# Patient Record
Sex: FEMALE | Race: BLACK OR AFRICAN AMERICAN | NOT HISPANIC OR LATINO | Employment: UNEMPLOYED | URBAN - METROPOLITAN AREA
[De-identification: names, ages, dates, MRNs, and addresses within clinical notes are randomized per-mention and may not be internally consistent; named-entity substitution may affect disease eponyms.]

---

## 2023-06-30 ENCOUNTER — APPOINTMENT (OUTPATIENT)
Dept: GENERAL RADIOLOGY | Facility: CLINIC | Age: 53
End: 2023-06-30
Attending: EMERGENCY MEDICINE
Payer: COMMERCIAL

## 2023-06-30 ENCOUNTER — HOSPITAL ENCOUNTER (EMERGENCY)
Facility: CLINIC | Age: 53
Discharge: HOME | End: 2023-06-30
Attending: EMERGENCY MEDICINE | Admitting: EMERGENCY MEDICINE
Payer: COMMERCIAL

## 2023-06-30 VITALS
DIASTOLIC BLOOD PRESSURE: 83 MMHG | TEMPERATURE: 97.2 F | OXYGEN SATURATION: 98 % | SYSTOLIC BLOOD PRESSURE: 119 MMHG | HEART RATE: 92 BPM | RESPIRATION RATE: 18 BRPM

## 2023-06-30 DIAGNOSIS — S91.341A PUNCTURE WOUND OF RIGHT FOOT WITH FOREIGN BODY, INITIAL ENCOUNTER: ICD-10-CM

## 2023-06-30 PROCEDURE — 73630 X-RAY EXAM OF FOOT: CPT | Mod: RT

## 2023-06-30 PROCEDURE — 250N000009 HC RX 250

## 2023-06-30 PROCEDURE — 10120 INC&RMVL FB SUBQ TISS SMPL: CPT

## 2023-06-30 PROCEDURE — 99283 EMERGENCY DEPT VISIT LOW MDM: CPT

## 2023-06-30 PROCEDURE — 99283 EMERGENCY DEPT VISIT LOW MDM: CPT | Mod: 25

## 2023-06-30 RX ORDER — LIDOCAINE HYDROCHLORIDE AND EPINEPHRINE 10; 10 MG/ML; UG/ML
INJECTION, SOLUTION INFILTRATION; PERINEURAL
Status: COMPLETED
Start: 2023-06-30 | End: 2023-06-30

## 2023-06-30 RX ADMIN — LIDOCAINE HYDROCHLORIDE,EPINEPHRINE BITARTRATE: 10; .01 INJECTION, SOLUTION INFILTRATION; PERINEURAL at 10:39

## 2023-06-30 ASSESSMENT — ACTIVITIES OF DAILY LIVING (ADL)
ADLS_ACUITY_SCORE: 35
ADLS_ACUITY_SCORE: 35

## 2023-06-30 NOTE — ED TRIAGE NOTES
Pt arrives with c/o stepping on a needle last night. Pt's family member reports they pulled it out but pt is still having pain and they are concerned a piece of it is still in the foot. Last tetanus in 2016 per [facility]. Pt ambulatory with a limp in triage.     Triage Assessment     Row Name 06/30/23 0853       Triage Assessment (Adult)    Airway WDL WDL       Respiratory WDL    Respiratory WDL WDL       Skin Circulation/Temperature WDL    Skin Circulation/Temperature WDL WDL       Cardiac WDL    Cardiac WDL WDL       Peripheral/Neurovascular WDL    Peripheral Neurovascular WDL WDL       Cognitive/Neuro/Behavioral WDL    Cognitive/Neuro/Behavioral WDL WDL

## 2023-06-30 NOTE — ED PROVIDER NOTES
[facility]  [facility]  5:00 PM  6/30/2023    Amparo Holloway  52 year oldfemale    Chief Complaint   Patient presents with     Foot Pain       HPI:  52-year-old female otherwise healthy here with right heel pain after accidentally stepping on a sewing needle last night.  They pulled out part of the needle but believe part of it is still embedded.  Tetanus is up-to-date.        ROS: ROS is negative other than mentioned above in HPI          No current outpatient medications        Not on File      Physical Exam  /83   Pulse 92   Temp 97.2  F (36.2  C) (Temporal)   Resp 18   SpO2 98%       CV: ppi, regular   Resp: speaking in full sentences without any resp distress  Extremity: Right heel just lateral of midline there is a small puncture wound site no surrounding redness no purulent drainage, no active bleeding  Neuro: Alert, no gross motor or sensory deficits,  gait stable      Labs and Imaging:    Labs Ordered and Resulted from Time of ED Arrival to Time of ED Departure - No data to display      Foot  XR, G/E 3 views, right   Final Result   IMPRESSION:      There is a 12 mm linear metallic object projecting at the plantar   aspect of the heel with pointed tip, suspicious for retained needle   fragment. No acute fracture or dislocation. Mild degenerative changes   of the first MTP joint. Small Achilles enthesophyte.      NOTE: ABNORMAL REPORT      THE DICTATION ABOVE DESCRIBES AN ABNORMAL REPORT FOR WHICH FOLLOW-UP   IS NEEDED.      DELFINA ISAACS MD            SYSTEM ID:  [id number]              Medications Administered in ED:  Medications   lidocaine 1% with EPINEPHrine 1:100,000 1 %-1:[id number] injection (  $Given by Other 6/30/23 1039)             Independent Historian:      Review of External Notes:     Independent Interpretation (X-rays, CTs, rhythm strip):       Consultations/Discussion of Management or Tests:       Social Determinants of Health affecting  care:  None          Medical Decision Making:    Right foot retained metallic foreign body after stepping on a needle.  Radiograph confirming retained foreign body.  Underwent removal here in the ED with success.  Discharged home.  Wound cleansed, low risk of infection.          Foreign body removal note  Indication: Retained sewing needle right heel pad  Consent: Patient verbal  Skin cleansed with alcohol and local anesthetic 1% lidocaine with epi used: 5 mL in total  Procedure: 11 blade scalpel used to make a small incision at the puncture site.  We then used blunt tools in the form of a splinter removal and  small alligator scissors to eventually localize the retained foreign body and remove it in its entirety.  No significant bleeding.  No signs of purulent drainage.  No complications.          Diagnosis:    ICD-10-CM    1. Puncture wound of right foot with foreign body, initial encounter  S91.341A           Disposition:  Home      Valeriano Vazquez MD  [facility]       Valeriano Vazquez MD  06/30/23 6360